# Patient Record
Sex: FEMALE | Race: BLACK OR AFRICAN AMERICAN
[De-identification: names, ages, dates, MRNs, and addresses within clinical notes are randomized per-mention and may not be internally consistent; named-entity substitution may affect disease eponyms.]

---

## 2019-09-03 ENCOUNTER — HOSPITAL ENCOUNTER (EMERGENCY)
Dept: HOSPITAL 92 - ERS | Age: 34
Discharge: HOME | End: 2019-09-03
Payer: COMMERCIAL

## 2019-09-03 DIAGNOSIS — R10.31: Primary | ICD-10-CM

## 2019-09-03 LAB
ALBUMIN SERPL BCG-MCNC: 3.9 G/DL (ref 3.5–5)
ALP SERPL-CCNC: 42 U/L (ref 40–150)
ALT SERPL W P-5'-P-CCNC: 14 U/L (ref 8–55)
ANION GAP SERPL CALC-SCNC: 11 MMOL/L (ref 10–20)
AST SERPL-CCNC: 13 U/L (ref 5–34)
BASOPHILS # BLD AUTO: 0.1 THOU/UL (ref 0–0.2)
BASOPHILS NFR BLD AUTO: 1.1 % (ref 0–1)
BILIRUB SERPL-MCNC: 0.2 MG/DL (ref 0.2–1.2)
BUN SERPL-MCNC: 13 MG/DL (ref 7–18.7)
CALCIUM SERPL-MCNC: 9.8 MG/DL (ref 7.8–10.44)
CHLORIDE SERPL-SCNC: 105 MMOL/L (ref 98–107)
CO2 SERPL-SCNC: 25 MMOL/L (ref 22–29)
CREAT CL PREDICTED SERPL C-G-VRATE: 0 ML/MIN (ref 70–130)
EOSINOPHIL # BLD AUTO: 0.3 THOU/UL (ref 0–0.7)
EOSINOPHIL NFR BLD AUTO: 3.5 % (ref 0–10)
GLOBULIN SER CALC-MCNC: 3.9 G/DL (ref 2.4–3.5)
GLUCOSE SERPL-MCNC: 103 MG/DL (ref 70–105)
HGB BLD-MCNC: 12.2 G/DL (ref 12–16)
LEUKOCYTE ESTERASE UR QL STRIP.AUTO: 250 LEU/UL
LYMPHOCYTES # BLD: 3.7 THOU/UL (ref 1.2–3.4)
LYMPHOCYTES NFR BLD AUTO: 47.5 % (ref 21–51)
MCH RBC QN AUTO: 32.4 PG (ref 27–31)
MCV RBC AUTO: 95.4 FL (ref 78–98)
MONOCYTES # BLD AUTO: 0.6 THOU/UL (ref 0.11–0.59)
MONOCYTES NFR BLD AUTO: 7.1 % (ref 0–10)
NEUTROPHILS # BLD AUTO: 3.1 THOU/UL (ref 1.4–6.5)
NEUTROPHILS NFR BLD AUTO: 40.7 % (ref 42–75)
PLATELET # BLD AUTO: 255 THOU/UL (ref 130–400)
POTASSIUM SERPL-SCNC: 3.8 MMOL/L (ref 3.5–5.1)
PREGU CONTROL BACKGROUND?: (no result)
PREGU CONTROL BAR APPEAR?: YES
PROT UR STRIP.AUTO-MCNC: 20 MG/DL
RBC # BLD AUTO: 3.76 MILL/UL (ref 4.2–5.4)
RBC UR QL AUTO: (no result) HPF (ref 0–3)
SODIUM SERPL-SCNC: 137 MMOL/L (ref 136–145)
WBC # BLD AUTO: 7.7 THOU/UL (ref 4.8–10.8)
WBC UR QL AUTO: (no result) HPF (ref 0–3)

## 2019-09-03 PROCEDURE — 76856 US EXAM PELVIC COMPLETE: CPT

## 2019-09-03 PROCEDURE — 93976 VASCULAR STUDY: CPT

## 2019-09-03 PROCEDURE — 81025 URINE PREGNANCY TEST: CPT

## 2019-09-03 PROCEDURE — 85025 COMPLETE CBC W/AUTO DIFF WBC: CPT

## 2019-09-03 PROCEDURE — 81003 URINALYSIS AUTO W/O SCOPE: CPT

## 2019-09-03 PROCEDURE — 96372 THER/PROPH/DIAG INJ SC/IM: CPT

## 2019-09-03 PROCEDURE — 74177 CT ABD & PELVIS W/CONTRAST: CPT

## 2019-09-03 PROCEDURE — 80053 COMPREHEN METABOLIC PANEL: CPT

## 2019-09-03 PROCEDURE — 81015 MICROSCOPIC EXAM OF URINE: CPT

## 2019-09-03 PROCEDURE — 83690 ASSAY OF LIPASE: CPT

## 2019-09-03 NOTE — ULT
PRELIMINARY REPORT/[facility]/EMERGENCY AFTER

HOURS PROCEDURE: 

 

EXAM:

US Pelvis Complete, Transabdominal and US Duplex Artery or Vein, Ovaries, Limited

 

EXAM DATE/TIME:

9/3/2019 3:44 AM

 

CLINICAL HISTORY:

34 years old, female; Patient HX: Rlq pelvic pain x 1 month, nausea; Additional info: Please compare 
to previous CT

 

TECHNIQUE:

Imaging protocol: Real-time transabdominal pelvic ultrasound with image documentation. Real-time dupl
ex ultrasound scan of the arterial or venous flow of the ovaries with B-mode, color Doppler flow and 
spectral waveform analysis. Complete Pelvis, Limited Duplex.

 

COMPARISON:

CT Abdomen Pelvis W Con 9/3/2019 2:37 AM

 

FINDINGS:

Uterus/cervix: Uterus measures 10.7 x 7.8 x 9.2 cm. Endometrial thickness measures 5 mm. There are nu
merous uterine fibroids for example a 2.2 x 1.4 x 1.3 cm fibroid within the left subserosal region, a
 2.8 x 1.7 x 2.0 cm fibroid in the left submucosal region, a 3.4 cm fibroid in the left lower

uterine segment and a 2.3 x 1.2 x 2.0 cm fibroid in the submucosal right fundal region.

Right adnexa: Right ovary measures 3.3 x 1.9 x 2.2 cm. Normal right ovarian arterial and venous wavef
orms. No torsion.

Left adnexa: Left ovary measures 4.3 x 1.7 x 2.3 cm. Normal left ovarian arterial and venous waveform
s. No torsion.

Free fluid: There is no free fluid.

Bladder: Normal.

 

IMPRESSION:

1. Fibroid uterus as above; largest measuring 3.4 cm within the left uterus.

2. No evidence of ovarian torsion on either side.

 

Thank you for allowing us to participate in the care of your patient.

Dictated and Authenticated by: Gadiel Otoole MD

09/03/2019 4:33 AM Central Time ([place] & [place])

 

 

 

PELVIC ULTRASOUND:

 

HISTORY:

Right lower quadrant pain.

 

TECHNIQUE:

Transabdominal exam only.  No endovaginal performed.

 

FINDINGS:

Real-time imaging of the pelvis obtained transabdominally shows the uterus measuring 7.8 x 9.2 x 10.7
 cm.  Several fibroids were identified.  The largest was 3.4 cm.  The right and left ovaries are both
 difficult to visualize but appear unremarkable.

 

On Doppler evaluation with spectral analysis, normal flow is shown to the adnexa.

 

IMPRESSION:

Enlarged fibromatous appearing uterus; otherwise unremarkable examination.

 

This report is in agreement with the preliminary report issued by [facility]. 

 

POS: OFF

## 2019-09-03 NOTE — CT
PRELIMINARY REPORT/[facility]/EMERGENCY AFTER

HOURS PROCEDURE: 

 

EXAM:

CT Abdomen and Pelvis With Contrast

 

EXAM DATE/TIME:

9/3/2019 2:37 AM

 

CLINICAL HISTORY:

34 years old, female; Acute; Patient HX: 33yo F presenting with cc of rlq abdominal pain. She states 
she has had this pain for about a month and that it has gotten worse which is why she came in. She st
ates that it is located in the rlq and does not radiate, stabbing in nature. She states it is intermi
ttently occurring daily, about every 3 hours. Laying down flat, walking, and movement makes it worse.
 Initially ibuprofen helped with the pain but it has started to stop working. She endorses some nause
a

but no vomiting. Denies any fever, chills, SOB, chest pain, palpitations, diarrhea or constipation. S
he denies any hematuria or dysuria.

 

TECHNIQUE:

Imaging protocol: Computed tomography of the abdomen and pelvis with intravenous contrast. 

 

COMPARISON:

No relevant prior studies available.

 

FINDINGS:

Liver: There are no focal liver lesions identified.

Gallbladder and bile ducts: There are numerous stones within the gallbladder.

Pancreas: Normal. No ductal dilation.

Spleen: Normal. No splenomegaly.

Adrenals: Normal. No mass.

Kidneys and ureters: The kidneys appear normal. No hydronephrosis.

Stomach and bowel: The transverse colon is collapsed

Appendix: A normal appendix is identified.

Intraperitoneal space: Unremarkable. No free air. No significant fluid collection.

Vasculature: Unremarkable. No abdominal aortic aneurysm.

Lymph nodes: Unremarkable. No enlarged lymph nodes.

Bladder: The bladder is normal.

Reproductive: There is an enlarged fibroid uterus.

Bones/joints: Unremarkable. No acute fracture.

Soft tissues: Unremarkable.

 

IMPRESSION:

1. Cholelithiasis.

2. Fibroid uterus.

 

Thank you for allowing us to participate in the care of your patient.

Dictated and Authenticated by: Gadiel Otoole MD

09/03/2019 3:00 AM Central Time ([place] & [place])

 

 

 

 

FINAL REPORT 

 

CT ABDOMEN AND PELVIS:

 

Date:  09/03/19 

 

COMPARISON:  

None. 

 

HISTORY:  

Right lower quadrant pain. 

 

FINDINGS:

The visualized lung bases are unremarkable. There is no free intraperitoneal air apparent. 

 

The liver and spleen are grossly unremarkable. There is cholelithiasis. Pancreas, adrenal glands, and
 kidneys are grossly unremarkable. 

 

The lack of oral contrast media limits assessment of the bowel. 

 

Uterus is lobulated and enlarged, containing numerous solid masses suggesting uterine fibroid disease
. 

 

No focal area of bowel inflammatory change is seen. No evidence for bowel obstruction. 

 

The appendix appears unremarkable. 

 

The vascular structures of the abdomen/pelvis appear patent. No abdominal or pelvic lymphadenopathy i
s seen. 

 

No acute osseous abnormality is noted. 

 

IMPRESSION: 

1.  Cholelithiasis. 

2.  Fibroid uterus. 

 

This report is in agreement with the preliminary report given by [facility]. 

 

 

POS: OFF

## 2020-08-05 ENCOUNTER — HOSPITAL ENCOUNTER (OUTPATIENT)
Dept: HOSPITAL 92 - BICULT | Age: 35
Discharge: HOME | End: 2020-08-05
Attending: NURSE PRACTITIONER
Payer: MEDICAID

## 2020-08-05 DIAGNOSIS — N93.9: Primary | ICD-10-CM

## 2020-08-05 DIAGNOSIS — D25.9: ICD-10-CM

## 2020-08-05 DIAGNOSIS — R93.89: ICD-10-CM

## 2020-08-05 PROCEDURE — 76856 US EXAM PELVIC COMPLETE: CPT

## 2020-08-05 NOTE — ULT
Exam: 

Pelvic ultrasound



HISTORY:

 Abnormal vaginal bleeding.



COMPARISON: 

9/3/2019



TECHNIQUE: Multiple grayscale and color Doppler images were obtained in a transabdominal and transvag
inal pelvic ultrasound. Spectral analysis of the Doppler waveforms of the ovaries were performed.





FINDINGS:

CERVIX: Small nabothian cyst.

UTERUS: Uterus is heterogeneous in appearance with evidence of heterogeneous masses. Largest mass is 
seen in the uterine fundus which measures 5 cm in maximal dimensions. A few additional smaller

uterine masses are seen. These masses are likely related to uterine fibroids.

ENDOMETRIAL STRIPE: 17 mm which is increased in thickness. No fluid or fluid collection is seen in th
e endometrial canal.



No free fluid is present.



RIGHT OVARY: Normal flow, without focal mass.

LEFT OVARY: Normal flow, without focal mass.





IMPRESSION: 



1. Fibroid uterus with largest fibroid measuring 5 cm in the region of the body and fundus of the eugene
jeaneth on the left.

2. Mild thickening of the endometrial stripe measuring 17 mm.



Reported By: Arjun Meier 

Electronically Signed:  8/5/2020 3:12 PM